# Patient Record
(demographics unavailable — no encounter records)

---

## 2025-02-09 NOTE — HISTORY OF PRESENT ILLNESS
[Fever] : FEVER [de-identified] : Pt had a fever last night of 102.5 F and aches, pain, and cough. [FreeTextEntry6] : Mother had Influenza A last week.

## 2025-02-09 NOTE — PHYSICAL EXAM
[Clear to Auscultation Bilaterally] : clear to auscultation bilaterally [NL] : warm, clear [FreeTextEntry7] : cough, productive